# Patient Record
(demographics unavailable — no encounter records)

---

## 2025-01-26 NOTE — PHYSICAL EXAM
[No Rash or Lesion] : No rash or lesion [Alert] : alert [Oriented to Person] : oriented to person [Oriented to Place] : oriented to place [Oriented to Time] : oriented to time [Calm] : calm [de-identified] : A/Ox3; NAD. appears comfortable [de-identified] : palpable mass to R submandibular region  [de-identified] : airway patent, no use of accessory muscles [de-identified] : abd is soft, NT/ND

## 2025-01-26 NOTE — CONSULT LETTER
[Dear  ___] : Dear  [unfilled], [Consult Letter:] : I had the pleasure of evaluating your patient, [unfilled]. [Consult Closing:] : Thank you very much for allowing me to participate in the care of this patient.  If you have any questions, please do not hesitate to contact me. [Sincerely,] : Sincerely, [FreeTextEntry3] : Aaron Rosenberg MD General Surgery

## 2025-01-26 NOTE — PLAN
[FreeTextEntry1] : CT of Neck to evaluate palpable R submandibular mass  Pt had recent labs -CMP done 1 week ago and has copy of results  RTO after the study   Patient's questions and concerns addressed to their satisfaction, and patient verbalized an understanding of the information discussed.

## 2025-01-26 NOTE — ASSESSMENT
[FreeTextEntry1] : IMP: 30 yo M with palpable mass of R submandibular region - approx 2.5-3 cm in size

## 2025-01-26 NOTE — PHYSICAL EXAM
[No Rash or Lesion] : No rash or lesion [Alert] : alert [Oriented to Person] : oriented to person [Oriented to Place] : oriented to place [Oriented to Time] : oriented to time [Calm] : calm [de-identified] : A/Ox3; NAD. appears comfortable [de-identified] : palpable mass to R submandibular region  [de-identified] : airway patent, no use of accessory muscles [de-identified] : abd is soft, NT/ND

## 2025-01-26 NOTE — HISTORY OF PRESENT ILLNESS
[de-identified] : Mr. Eden is a 31 year y/o M here for consult visit w cc of having a palpable lump to the R submandibular region. He has noticed it there for a few years and notes it has changed in size. No significant PMH. No home meds.  No previous imaging studies other than carotid study.   PCP: Dr. Anthony Tapia

## 2025-01-26 NOTE — ASSESSMENT
[FreeTextEntry1] : IMP: 32 yo M with palpable mass of R submandibular region - approx 2.5-3 cm in size

## 2025-01-26 NOTE — HISTORY OF PRESENT ILLNESS
[de-identified] : Mr. Eden is a 31 year y/o M here for consult visit w cc of having a palpable lump to the R submandibular region. He has noticed it there for a few years and notes it has changed in size. No significant PMH. No home meds.  No previous imaging studies other than carotid study.   PCP: Dr. Anthony Tapia